# Patient Record
Sex: MALE | NOT HISPANIC OR LATINO | URBAN - METROPOLITAN AREA
[De-identification: names, ages, dates, MRNs, and addresses within clinical notes are randomized per-mention and may not be internally consistent; named-entity substitution may affect disease eponyms.]

---

## 2024-11-08 ENCOUNTER — OFFICE VISIT (OUTPATIENT)
Dept: URGENT CARE | Age: 34
End: 2024-11-08
Payer: COMMERCIAL

## 2024-11-08 VITALS
TEMPERATURE: 98.2 F | WEIGHT: 240 LBS | HEIGHT: 75 IN | HEART RATE: 79 BPM | DIASTOLIC BLOOD PRESSURE: 99 MMHG | OXYGEN SATURATION: 99 % | SYSTOLIC BLOOD PRESSURE: 146 MMHG | RESPIRATION RATE: 18 BRPM | BODY MASS INDEX: 29.84 KG/M2

## 2024-11-08 DIAGNOSIS — R05.9 COUGH, UNSPECIFIED TYPE: ICD-10-CM

## 2024-11-08 DIAGNOSIS — R68.89 FLU-LIKE SYMPTOMS: ICD-10-CM

## 2024-11-08 DIAGNOSIS — J01.90 ACUTE SINUSITIS, RECURRENCE NOT SPECIFIED, UNSPECIFIED LOCATION: Primary | ICD-10-CM

## 2024-11-08 LAB
POC RAPID INFLUENZA A: NEGATIVE
POC RAPID INFLUENZA B: NEGATIVE
POC SARS-COV-2 AG BINAX: NORMAL

## 2024-11-08 RX ORDER — BROMPHENIRAMINE MALEATE, PSEUDOEPHEDRINE HYDROCHLORIDE, AND DEXTROMETHORPHAN HYDROBROMIDE 2; 30; 10 MG/5ML; MG/5ML; MG/5ML
10 SYRUP ORAL 4 TIMES DAILY PRN
Qty: 250 ML | Refills: 0 | Status: SHIPPED | OUTPATIENT
Start: 2024-11-08 | End: 2024-11-18

## 2024-11-08 RX ORDER — AZITHROMYCIN 250 MG/1
TABLET, FILM COATED ORAL
Qty: 6 TABLET | Refills: 0 | Status: SHIPPED | OUTPATIENT
Start: 2024-11-08

## 2024-11-08 ASSESSMENT — ENCOUNTER SYMPTOMS
DIARRHEA: 1
SORE THROAT: 1
COUGH: 1

## 2024-11-08 NOTE — PROGRESS NOTES
"Subjective   Patient ID: Brayden Escobar is a 33 y.o. male. They present today with a chief complaint of Diarrhea, Cough, and Sore Throat.    History of Present Illness  : Patient is a pleasant 83-year-old white male, no significant past medical history, presented to clinic with complaint of upper respiratory congestion.  Patient is reporting approximately 2-week history of what began as cold-like symptoms however is presenting to the clinic today out of concern for persistent upper respiratory congestion increasing sinus pain and pressure with increasingly purulent nasal discharge.  Denies any fever or chills.  Does report a dry cough as well.  No sore throat.  No ear pain.  No abdominal pain, nausea, vomiting.  No myalgias, arthralgias, generalized weakness, fatigue, numbness, tingling, focal weakness.      Diarrhea  Cough  Associated symptoms include a sore throat.   Sore Throat   Associated symptoms include coughing and diarrhea.       Past Medical History  Allergies as of 11/08/2024    (No Known Allergies)       (Not in a hospital admission)         No past medical history on file.    No past surgical history on file.         Review of Systems  Review of Systems   HENT:  Positive for sore throat.    Respiratory:  Positive for cough.    Gastrointestinal:  Positive for diarrhea.                                  Objective    Vitals:    11/08/24 1108   BP: (!) 146/99   Pulse: 79   Resp: 18   Temp: 36.8 °C (98.2 °F)   TempSrc: Oral   SpO2: 99%   Weight: 109 kg (240 lb)   Height: 1.905 m (6' 3\")     No LMP for male patient.    Physical Exam  General: Vitals Noted. No distress. Normocephalic.     HEENT: TMs normal, EOMI, normal conjunctiva, patent nares with erythematous edematous and appear nasal turbinates and clear rhinorrhea bilaterally.  Posterior oropharynx with signs of postnasal drainage without any erythema swelling or tonsillar exudate.  Uvula is in the midline and nonedematous.  No drooling.  No trismus.  " Positive tenderness to palpation and percussion over bilateral frontal sinuses with no overlying skin changes.    Neck: Supple with no adenopathy.     Cardiac: Regular Rate and Rhythm. No murmur.     Pulmonary: Equal breath sounds bilaterally. No wheezes, rhonchi, or rales.    Abdomen: Soft, non-tender, with normal bowel sounds.     Musculoskeletal: Moves all extremities, no effusion, no edema.     Skin: No obvious rashes.  Procedures    Point of Care Test & Imaging Results from this visit  Results for orders placed or performed in visit on 11/08/24   POCT BinaxNOW Covid-19 Ag Card manually resulted    Collection Time: 11/08/24 11:15 AM   Result Value Ref Range    POC ROBERT-COV-2 AG  Presumptive negative test for SARS-CoV-2 (no antigen detected)     Presumptive negative test for SARS-CoV-2 (no antigen detected)   POCT Influenza A/B manually resulted    Collection Time: 11/08/24 11:15 AM   Result Value Ref Range    POC Rapid Influenza A Negative Negative    POC Rapid Influenza B Negative Negative      No results found.    Diagnostic study results (if any) were reviewed by Bry Easley PA-C.    Assessment/Plan   Allergies, medications, history, and pertinent labs/EKGs/Imaging reviewed by Bry Easley PA-C.     Medical Decision Making   Patient was seen and evaluated in clinic for chief complaint of upper respiratory congestion.  On exam patient is nontoxic well-appearing resting comfortably no acute distress.  Vital signs are stable, afebrile.  Chest is clear, heart is regular, belly soft and nontender.  ENT exam as above consistent with a viral infection with concurrent developing acute sinusitis.  Will treat with a Z-Nicholas and Bromfed-DM to use as needed for cough and congestion.  Advised to follow close with his primary care physician in the next week.  Advised to drink plenty of clear fluids and rest at home.  Tylenol or Profen every 6 hours needed for pain.  I reviewed my impression, plan, strict  return precautions with the patient.  He expresses understanding and agreement plan of care.    Orders and Diagnoses  Diagnoses and all orders for this visit:  Cough, unspecified type  -     POCT BinaxNOW Covid-19 Ag Card manually resulted  Flu-like symptoms  -     POCT Influenza A/B manually resulted        Medical Admin Record      Follow Up Instructions  No follow-ups on file.    Patient disposition: Home    Electronically signed by Bry Easley PA-C  11:21 AM